# Patient Record
Sex: FEMALE | Race: WHITE
[De-identification: names, ages, dates, MRNs, and addresses within clinical notes are randomized per-mention and may not be internally consistent; named-entity substitution may affect disease eponyms.]

---

## 2018-03-09 ENCOUNTER — HOSPITAL ENCOUNTER (OUTPATIENT)
Dept: HOSPITAL 47 - RADMAMWWP | Age: 53
Discharge: HOME | End: 2018-03-09
Attending: FAMILY MEDICINE
Payer: COMMERCIAL

## 2018-03-09 DIAGNOSIS — Z12.31: Primary | ICD-10-CM

## 2018-03-09 PROCEDURE — 77063 BREAST TOMOSYNTHESIS BI: CPT

## 2018-03-09 PROCEDURE — 77067 SCR MAMMO BI INCL CAD: CPT

## 2018-03-19 NOTE — MM
Reason for exam: screening  (asymptomatic).

Last mammogram was performed 1 year and 10 months ago.



History:

Patient is postmenopausal.

Family history of breast cancer in 3 maternal aunts, breast cancer in aunt, and 

breast cancer in maternal cousin.



Physical Findings:

A clinical breast exam by your physician is recommended on an annual basis and 

results should be correlated with mammographic findings.



MG 3D Screening Mammo W/Cad

Bilateral CC and MLO view(s) were taken.

Prior study comparison: May 12, 2016, mammogram, performed at [place].

The breast tissue is heterogeneously dense. This may lower the sensitivity of 

mammography.  No significant changes when compared with prior studies.





ASSESSMENT: Negative, BI-RAD 1



RECOMMENDATION:

Routine screening mammogram of both breasts in 1 year.

## 2019-03-26 ENCOUNTER — HOSPITAL ENCOUNTER (OUTPATIENT)
Dept: HOSPITAL 47 - ORWHC2ENDO | Age: 54
End: 2019-03-26
Payer: COMMERCIAL

## 2019-03-26 VITALS — DIASTOLIC BLOOD PRESSURE: 75 MMHG | SYSTOLIC BLOOD PRESSURE: 108 MMHG | HEART RATE: 69 BPM

## 2019-03-26 VITALS — BODY MASS INDEX: 61.2 KG/M2

## 2019-03-26 VITALS — RESPIRATION RATE: 16 BRPM | TEMPERATURE: 96.4 F

## 2019-03-26 DIAGNOSIS — Z83.71: ICD-10-CM

## 2019-03-26 DIAGNOSIS — Z80.0: ICD-10-CM

## 2019-03-26 DIAGNOSIS — G43.909: ICD-10-CM

## 2019-03-26 DIAGNOSIS — Z79.899: ICD-10-CM

## 2019-03-26 DIAGNOSIS — Z12.11: Primary | ICD-10-CM

## 2019-03-26 DIAGNOSIS — K21.9: ICD-10-CM

## 2019-03-26 DIAGNOSIS — M19.90: ICD-10-CM

## 2019-03-26 PROCEDURE — 45378 DIAGNOSTIC COLONOSCOPY: CPT

## 2019-03-26 NOTE — P.PCN
Date of Procedure: 03/26/19


Procedure(s) Performed: 


Procedure: Total colonoscopy.





Preoperative diagnosis: Screening for neoplasia.





Postoperative diagnosis: Exam within normal limits.





Preparation: HalfLytely prep.





Sedation: Was provided by anesthesia.





Brief clinical history: The patient is a 54-year-old female who is scheduled for

this evaluation for screening for neoplasia because of family history of colon 

cancer and a GI as his risk factor.  There is family history of colon cancer in 

her father and polyps in her mother and sister.  The patient that couple prior 

exams the last was around 6 or 7 years ago area





Procedure: With the patient on her left lateral decubitus position and after 

informed consent and adequate sedation, the perianal area was inspected and it 

did not show any fissures or fistulas.  There were no masses felt on digital 

rectal examination.  The Olympus CFH 190L video colonoscope was then inserted in

the rectum and the usual fashion and advanced to the cecum.  The mucosa appeared

healthy.  No polyps or tumors were seen or any obvious diverticular disease or 

other pathology.  I retroflexed endoscope in the rectum before the endoscope was

withdrawn.





The patient tolerated the procedure well.





Plan: The patient was reassured.  She'll follow-up with you as planned and I 

recommended repeat exam in 5 years.

## 2019-12-21 ENCOUNTER — HOSPITAL ENCOUNTER (OUTPATIENT)
Dept: HOSPITAL 47 - RADMAMWWP | Age: 54
Discharge: HOME | End: 2019-12-21
Attending: FAMILY MEDICINE
Payer: COMMERCIAL

## 2019-12-21 DIAGNOSIS — Z12.31: Primary | ICD-10-CM

## 2019-12-21 PROCEDURE — 77067 SCR MAMMO BI INCL CAD: CPT

## 2019-12-27 NOTE — MM
Reason for exam: screening  (asymptomatic).

Last mammogram was performed 1 year and 9 months ago.



History:

Patient is postmenopausal.

Family history of breast cancer in 3 maternal aunts, breast cancer in aunt, and 

breast cancer in maternal cousin.



Physical Findings:

A clinical breast exam by your physician is recommended on an annual basis and 

results should be correlated with mammographic findings.



MG Screening Mammo w CAD

Bilateral CC, MLO, and XCCL view(s) were taken.

Prior study comparison: March 9, 2018, bilateral MG 3d screening mammo w/cad.  May

12, 2016, mammogram, performed at [place].

The breast tissue is heterogeneously dense. This may lower the sensitivity of 

mammography.  No significant changes when compared with prior studies.





ASSESSMENT: Negative, BI-RAD 1



RECOMMENDATION:

Routine screening mammogram of both breasts in 1 year.

## 2020-11-20 ENCOUNTER — HOSPITAL ENCOUNTER (OUTPATIENT)
Dept: HOSPITAL 47 - ORWHC2ENDO | Age: 55
Discharge: HOME | End: 2020-11-20
Attending: INTERNAL MEDICINE
Payer: COMMERCIAL

## 2020-11-20 VITALS — DIASTOLIC BLOOD PRESSURE: 51 MMHG | SYSTOLIC BLOOD PRESSURE: 112 MMHG | HEART RATE: 74 BPM

## 2020-11-20 VITALS — RESPIRATION RATE: 16 BRPM | TEMPERATURE: 97.4 F

## 2020-11-20 VITALS — BODY MASS INDEX: 27.6 KG/M2

## 2020-11-20 DIAGNOSIS — K29.50: Primary | ICD-10-CM

## 2020-11-20 DIAGNOSIS — K21.00: ICD-10-CM

## 2020-11-20 DIAGNOSIS — F32.9: ICD-10-CM

## 2020-11-20 DIAGNOSIS — Z79.899: ICD-10-CM

## 2020-11-20 PROCEDURE — 88305 TISSUE EXAM BY PATHOLOGIST: CPT

## 2020-11-20 PROCEDURE — 43239 EGD BIOPSY SINGLE/MULTIPLE: CPT

## 2020-11-20 NOTE — P.PCN
Date of Procedure: 11/20/20


Procedure(s) Performed: 


BRIEF HISTORY: Patient is a 55-year-old, pleasant, female scheduled for an upper

endoscopy as a part of evaluation of chronic heartburn and intermittent 

dysphagia to solids.  She was initially on Pepcid 20 mg daily but recently her 

medications were changed to Protonix 40 mg daily and since then the symptoms are

gradually improving.. 





PROCEDURE PERFORMED: Esophagogastroduodenoscopy with biopsy.





PREOPERATIVE DIAGNOSIS: GERD/intermittent dysphagia to solids. 





IV sedation per anesthesia. 





PROCEDURE: After informed consent was obtained, the patient  was brought into 

the endoscopy unit. IV sedation was administered by Anesthesia under continuous 

monitoring. Initially the Olympus GIF-140 video endoscope was inserted into the 

mouth. Esophagus intubated without any difficulty. It was gradually advanced 

into the stomach and duodenum and carefully examined. The bulb and the second 

part of the duodenum appeared normal. The scope at this time was withdrawn to 

the stomach, adequately insufflated with air, and upon careful examination, 

mucosa of the antrum, had mild gastritis and biopsies were done from this area. 

The body, cardia and the fundus appeared normal. The scope was then withdrawn 

into the esophagus. The GE junction was located at 39 cm from the incisors. The 

esophagus appeared normal. There were no erosions or ulcerations seen, biopsies 

were done from the distal esophagus and the patient tolerated the procedure 

well. 





IMPRESSION: 


1.  Normal-appearing esophagus with no evidence of esophagitis or esophageal 

stricture.


2.  Mild antral gastritis.





RECOMMENDATIONS: The findings of this examination were discussed with the 

patient as well as a family.  He was advised to follow with the biopsy results. 

She'll continue with Protonix 40 mg daily and continue to follow antireflux 

measures..

## 2025-04-18 ENCOUNTER — HOSPITAL ENCOUNTER (OUTPATIENT)
Dept: HOSPITAL 47 - LABWHC1 | Age: 60
Discharge: HOME | End: 2025-04-18
Attending: INTERNAL MEDICINE
Payer: COMMERCIAL

## 2025-04-18 DIAGNOSIS — Z00.00: Primary | ICD-10-CM

## 2025-04-18 DIAGNOSIS — G62.9: ICD-10-CM

## 2025-04-18 DIAGNOSIS — G47.00: ICD-10-CM

## 2025-04-18 DIAGNOSIS — E55.9: ICD-10-CM

## 2025-04-18 DIAGNOSIS — N95.1: ICD-10-CM

## 2025-04-18 DIAGNOSIS — E78.5: ICD-10-CM

## 2025-04-18 DIAGNOSIS — Z11.59: ICD-10-CM

## 2025-04-18 LAB
ALBUMIN SERPL-MCNC: 4.5 G/DL (ref 3.8–4.9)
ALBUMIN/GLOB SERPL: 1.96 RATIO (ref 1.6–3.17)
ALP SERPL-CCNC: 78 U/L (ref 41–126)
ALT SERPL-CCNC: 30 U/L (ref 8–44)
AST SERPL-CCNC: 24 U/L (ref 13–35)
BASOPHILS # BLD AUTO: 0.04 X 10*3/UL (ref 0–0.1)
BASOPHILS NFR BLD AUTO: 0.7 %
BUN SERPL-SCNC: 13.7 MG/DL (ref 9–27)
BUN/CREAT SERPL: 22.83 RATIO (ref 12–20)
CALCIUM SPEC-MCNC: 9.4 MG/DL (ref 8.7–10.3)
CCP IGG SERPL-ACNC: <1.5 U/ML (ref ?–3.9)
CHLORIDE SERPL-SCNC: 108 MMOL/L (ref 96–109)
DSDNA AB TITR SER: <1 IU/ML
EOSINOPHIL # BLD AUTO: 0.11 X 10*3/UL (ref 0.04–0.35)
EOSINOPHIL NFR BLD AUTO: 1.9 %
ERYTHROCYTE [DISTWIDTH] IN BLOOD BY AUTOMATED COUNT: 4.62 X 10*6/UL (ref 4.1–5.2)
ERYTHROCYTE [DISTWIDTH] IN BLOOD: 12.6 % (ref 11.5–14.5)
GLOBULIN SER CALC-MCNC: 2.3 G/DL (ref 1.6–3.3)
GLUCOSE SERPL-MCNC: 100 MG/DL (ref 70–110)
HCT VFR BLD AUTO: 41.2 % (ref 37.2–46.3)
IRON SERPL-MCNC: 94 UG/DL (ref 50–170)
LDLC SERPL CALC-MCNC: 133.4 MG/DL (ref 0–131)
LYMPHOCYTES # SPEC AUTO: 1.86 X 10*3/UL (ref 0.9–5)
LYMPHOCYTES NFR SPEC AUTO: 32.9 %
MCH RBC QN AUTO: 30.3 PG (ref 27–32)
MCV RBC AUTO: 89.2 FL (ref 80–97)
MONOCYTES # BLD AUTO: 0.36 X 10*3/UL (ref 0.2–1)
MONOCYTES NFR BLD AUTO: 6.4 %
NEUTROPHILS # BLD AUTO: 3.27 X 10*3/UL (ref 1.8–7.7)
NEUTROPHILS NFR BLD AUTO: 57.9 %
NRBC BLD AUTO-RTO: 0 X 10*3/UL (ref 0–0.01)
PLATELET # BLD AUTO: 228 X 10*3/UL (ref 140–440)
POTASSIUM SERPL-SCNC: 4.2 MMOL/L (ref 3.5–5.5)
PROT SERPL-MCNC: 6.8 G/DL (ref 6.2–8.2)
RHEUMATOID FACT SERPL-ACNC: <15 IU/ML (ref 0–15)
SODIUM SERPL-SCNC: 141 MMOL/L (ref 135–145)
TIBC SERPL-MCNC: 302 UG/DL (ref 228–460)
WBC # BLD AUTO: 5.65 X 10*3/UL (ref 4.5–10)

## 2025-04-18 PROCEDURE — 86803 HEPATITIS C AB TEST: CPT

## 2025-04-18 PROCEDURE — 80061 LIPID PANEL: CPT

## 2025-04-18 PROCEDURE — 82607 VITAMIN B-12: CPT

## 2025-04-18 PROCEDURE — 86200 CCP ANTIBODY: CPT

## 2025-04-18 PROCEDURE — 83540 ASSAY OF IRON: CPT

## 2025-04-18 PROCEDURE — 86334 IMMUNOFIX E-PHORESIS SERUM: CPT

## 2025-04-18 PROCEDURE — 36415 COLL VENOUS BLD VENIPUNCTURE: CPT

## 2025-04-18 PROCEDURE — 86038 ANTINUCLEAR ANTIBODIES: CPT

## 2025-04-18 PROCEDURE — 84681 ASSAY OF C-PEPTIDE: CPT

## 2025-04-18 PROCEDURE — 86431 RHEUMATOID FACTOR QUANT: CPT

## 2025-04-18 PROCEDURE — 82306 VITAMIN D 25 HYDROXY: CPT

## 2025-04-18 PROCEDURE — 85025 COMPLETE CBC W/AUTO DIFF WBC: CPT

## 2025-04-18 PROCEDURE — 84443 ASSAY THYROID STIM HORMONE: CPT

## 2025-04-18 PROCEDURE — 83550 IRON BINDING TEST: CPT

## 2025-04-18 PROCEDURE — 82746 ASSAY OF FOLIC ACID SERUM: CPT

## 2025-04-18 PROCEDURE — 83735 ASSAY OF MAGNESIUM: CPT

## 2025-04-18 PROCEDURE — 80053 COMPREHEN METABOLIC PANEL: CPT

## 2025-04-18 PROCEDURE — 86225 DNA ANTIBODY NATIVE: CPT

## 2025-06-10 ENCOUNTER — HOSPITAL ENCOUNTER (OUTPATIENT)
Dept: HOSPITAL 47 - RADMAMWWP | Age: 60
Discharge: HOME | End: 2025-06-10
Attending: INTERNAL MEDICINE
Payer: COMMERCIAL

## 2025-06-10 DIAGNOSIS — Z78.0: ICD-10-CM

## 2025-06-10 DIAGNOSIS — Z12.31: Primary | ICD-10-CM

## 2025-06-10 DIAGNOSIS — Z80.3: ICD-10-CM

## 2025-06-10 DIAGNOSIS — R92.333: ICD-10-CM

## 2025-06-10 PROCEDURE — 94726 PLETHYSMOGRAPHY LUNG VOLUMES: CPT

## 2025-06-10 PROCEDURE — 77067 SCR MAMMO BI INCL CAD: CPT

## 2025-06-10 PROCEDURE — 77063 BREAST TOMOSYNTHESIS BI: CPT

## 2025-06-10 PROCEDURE — 94729 DIFFUSING CAPACITY: CPT

## 2025-06-10 PROCEDURE — 94060 EVALUATION OF WHEEZING: CPT
